# Patient Record
Sex: MALE
[De-identification: names, ages, dates, MRNs, and addresses within clinical notes are randomized per-mention and may not be internally consistent; named-entity substitution may affect disease eponyms.]

---

## 2022-12-14 ENCOUNTER — NON-APPOINTMENT (OUTPATIENT)
Age: 67
End: 2022-12-14

## 2023-08-30 PROBLEM — Z00.00 ENCOUNTER FOR PREVENTIVE HEALTH EXAMINATION: Status: ACTIVE | Noted: 2023-08-30

## 2023-08-31 ENCOUNTER — APPOINTMENT (OUTPATIENT)
Dept: OTOLARYNGOLOGY | Facility: CLINIC | Age: 68
End: 2023-08-31
Payer: MEDICARE

## 2023-08-31 VITALS — HEIGHT: 72 IN | BODY MASS INDEX: 21.67 KG/M2 | WEIGHT: 160 LBS

## 2023-08-31 DIAGNOSIS — Z86.19 PERSONAL HISTORY OF OTHER INFECTIOUS AND PARASITIC DISEASES: ICD-10-CM

## 2023-08-31 DIAGNOSIS — Z83.49 FAMILY HISTORY OF OTHER ENDOCRINE, NUTRITIONAL AND METABOLIC DISEASES: ICD-10-CM

## 2023-08-31 DIAGNOSIS — Z78.9 OTHER SPECIFIED HEALTH STATUS: ICD-10-CM

## 2023-08-31 PROCEDURE — 31231 NASAL ENDOSCOPY DX: CPT

## 2023-08-31 PROCEDURE — 99204 OFFICE O/P NEW MOD 45 MIN: CPT | Mod: 25

## 2023-08-31 RX ORDER — PREDNISONE 20 MG/1
20 TABLET ORAL
Qty: 20 | Refills: 0 | Status: ACTIVE | COMMUNITY
Start: 2023-08-31 | End: 1900-01-01

## 2023-08-31 RX ORDER — ELASTIC BANDAGE 1"X2.2YD
2300-700 BANDAGE TOPICAL
Qty: 1 | Refills: 2 | Status: ACTIVE | COMMUNITY
Start: 2023-08-31 | End: 1900-01-01

## 2023-08-31 RX ORDER — FLUTICASONE PROPIONATE 50 UG/1
50 SPRAY, METERED NASAL TWICE DAILY
Qty: 1 | Refills: 3 | Status: ACTIVE | COMMUNITY
Start: 2023-08-31 | End: 1900-01-01

## 2023-08-31 NOTE — HISTORY OF PRESENT ILLNESS
[de-identified] : CC: nasal congestion  HISTORY OF PRESENT ILLNESS: Mr. Wu is a pleasant 68 year old gentleman with nasal congestion symptoms present for 7 weeks without antecedent event severe bilateral nasal obstruction, completely, foul discharge, PND, severe pansinus pressure, difficulty sleeping tried augmentin for a week without improvement, now on benadryl just to sleep otherwise healthy, nonsmoker starting to have some wheezing now  Environmental Allergies: no Immunotherapy: no Smoker: no Asthma: no ASA sensitivity: no History of Autoimmune Disease: No History of Immune Deficiency: No  Key Elements at least 4 described: Location: bilateral Severity: severe Quality:congestion Timing: constant Duration: 2 months Modifying Factors: none Associated signs and symptoms: see above  REVIEW OF SYSTEMS:  General ROS: negative for - chills, fatigue or fever Psychological ROS: negative for - anxiety or depression Ophthalmic ROS: negative for - blurry vision, decreased vision or double vision  ENT ROS: negative except as noted from HPI Allergy and Immunology ROS: negative except as noted from HPI Hematological and Lymphatic ROS: negative for - bleeding problems  Endocrine ROS: negative for - malaise/lethargy Respiratory ROS: negative for - stridor Cardiovascular ROS: negative for - chest pain Gastrointestinal ROS: negative for - appetite loss or nausea/vomiting Genitourinary ROS: negative for - incontinence Musculoskeletal ROS: negative for - gait disturbance  Neurological ROS: negative for - behavioral changes Dermatological ROS: negative for - nail changes  Physical Exam:  GENERAL APPEARANCE: Well-developed and No Acute Distress. COMMUNICATION: Able to Communicate. Strong Voice.  HEAD AND FACE Eyes: Testing of ocular motility including primary gaze alignment normal. Inspection and Appearance: No evidence of lesions or masses Palpation: Palpation of the face reveals no sinus tenderness Salivary Glands: Symmetric without masses Facial Strength: Symmetric without evidence of facial paralysis  EAR, NOSE, MOUTH, and THROAT: Ear Canals and Tympanic Membranes, Bilateral: No evidence of inflammation or lesions. Thresholds: Clinical speech reception thresholds normal. External, Nose and Auricle: No lesions or masses. Nasal, Mucosa, Septum, and Turbinates: See endoscopy.  NECK: Evaluation: No evidence of masses or crepitus. The neck is symmetric and the trachea is in the midline position. Thyroid: No evidence of enlargement, tenderness or mass. Neck Lymph Nodes: WNL. Respiratory: Inspection of the chest including symmetry, expansion and/or assessment of respiratory effort normal. Cardiovascular: Evaluation of peripheral vascular system by observation and palpation of capillary refill, normal. Neurological/Psychiatric: Alert, Oriented, Mood, and Affect Normal.  IMAGING:    PROCEDURE: Nasal endoscopy (95200)   SURGEON: Ish Godinez MD   Prior to the procedure, I had a discussion with the patient regarding the risks, benefits, and alternatives of the procedure and a verbal consent was obtained.   After obtaining adequate decongestion of the nasal mucosa with topical Epinephrine and adequate anesthesia with topical Lidocaine nasal spray, the nasal endoscope was used to examine the nasal passages and paranasal sinuses. The endoscope was passed along the floor of the nose bilaterally to evaluate the inferior meatus, floor of the nose, inferior turbinate, and nasopharynx. The scope was then passed superiorly to evaluate the area of the sphenoethmoidal recess, superior turbinate and superior meatus. As the scope was withdrawn anteriorly, the middle turbinate and middle meatus were carefully inspected. The endoscope was withdrawn and the patient tolerated the procedure well. No complications were encountered.   INSTRUMENTS: rigid 30   EXAM FINDINGS: severe edema, required pledget decongestion. foul smelling and discolored discharge on the left side. suctioned out. no polyps seen. veryy poor upper dentition  IMPRESSION: Mr. Wu is a pleasant 68 year old gentleman with poor dentition, acute pansinusitis failed abx x1  PLAN: -flonase/NSI/pred60/bactrim -see dentist -RTC 1 month  Ish Godinez MD [facility] Department of Otolaryngology- Head and Neck Surgery [facility]

## 2023-09-12 RX ORDER — SULFAMETHOXAZOLE AND TRIMETHOPRIM 800; 160 MG/1; MG/1
800-160 TABLET ORAL TWICE DAILY
Qty: 28 | Refills: 0 | Status: DISCONTINUED | COMMUNITY
Start: 2023-08-31 | End: 2023-09-12

## 2023-09-28 ENCOUNTER — APPOINTMENT (OUTPATIENT)
Dept: OTOLARYNGOLOGY | Facility: CLINIC | Age: 68
End: 2023-09-28
Payer: MEDICARE

## 2023-09-28 VITALS — BODY MASS INDEX: 21.67 KG/M2 | WEIGHT: 160 LBS | HEIGHT: 72 IN

## 2023-09-28 DIAGNOSIS — J01.40 ACUTE PANSINUSITIS, UNSPECIFIED: ICD-10-CM

## 2023-09-28 DIAGNOSIS — R09.81 NASAL CONGESTION: ICD-10-CM

## 2023-09-28 DIAGNOSIS — J34.89 OTHER SPECIFIED DISORDERS OF NOSE AND NASAL SINUSES: ICD-10-CM

## 2023-09-28 PROCEDURE — 99214 OFFICE O/P EST MOD 30 MIN: CPT | Mod: 25

## 2023-09-28 PROCEDURE — 31231 NASAL ENDOSCOPY DX: CPT

## 2023-10-02 ENCOUNTER — OUTPATIENT (OUTPATIENT)
Dept: OUTPATIENT SERVICES | Facility: HOSPITAL | Age: 68
LOS: 1 days | End: 2023-10-02
Payer: MEDICARE

## 2023-10-02 ENCOUNTER — RESULT REVIEW (OUTPATIENT)
Age: 68
End: 2023-10-02

## 2023-10-02 ENCOUNTER — APPOINTMENT (OUTPATIENT)
Dept: CT IMAGING | Facility: CLINIC | Age: 68
End: 2023-10-02

## 2023-10-02 PROCEDURE — 70486 CT MAXILLOFACIAL W/O DYE: CPT | Mod: 26,MH

## 2023-10-04 ENCOUNTER — APPOINTMENT (OUTPATIENT)
Dept: OTOLARYNGOLOGY | Facility: CLINIC | Age: 68
End: 2023-10-04
Payer: MEDICARE

## 2023-10-04 ENCOUNTER — TRANSCRIPTION ENCOUNTER (OUTPATIENT)
Age: 68
End: 2023-10-04

## 2023-10-04 DIAGNOSIS — J34.2 DEVIATED NASAL SEPTUM: ICD-10-CM

## 2023-10-04 DIAGNOSIS — J32.2 CHRONIC ETHMOIDAL SINUSITIS: ICD-10-CM

## 2023-10-04 DIAGNOSIS — J32.9 CHRONIC SINUSITIS, UNSPECIFIED: ICD-10-CM

## 2023-10-04 DIAGNOSIS — J32.0 CHRONIC MAXILLARY SINUSITIS: ICD-10-CM

## 2023-10-04 DIAGNOSIS — J32.3 CHRONIC SPHENOIDAL SINUSITIS: ICD-10-CM

## 2023-10-04 DIAGNOSIS — J32.1 CHRONIC FRONTAL SINUSITIS: ICD-10-CM

## 2023-10-04 PROCEDURE — 99213 OFFICE O/P EST LOW 20 MIN: CPT
